# Patient Record
Sex: FEMALE | Race: OTHER | HISPANIC OR LATINO | Employment: UNEMPLOYED | URBAN - NONMETROPOLITAN AREA
[De-identification: names, ages, dates, MRNs, and addresses within clinical notes are randomized per-mention and may not be internally consistent; named-entity substitution may affect disease eponyms.]

---

## 2023-04-20 ENCOUNTER — HOSPITAL ENCOUNTER (OUTPATIENT)
Dept: DATA CONVERSION | Facility: HOSPITAL | Age: 23
End: 2023-04-20
Attending: OBSTETRICS & GYNECOLOGY

## 2023-10-17 ENCOUNTER — APPOINTMENT (OUTPATIENT)
Dept: CARDIOLOGY | Facility: HOSPITAL | Age: 23
End: 2023-10-17

## 2023-10-17 ENCOUNTER — HOSPITAL ENCOUNTER (EMERGENCY)
Facility: HOSPITAL | Age: 23
Discharge: OTHER INSTITUTION | End: 2023-10-17
Attending: EMERGENCY MEDICINE

## 2023-10-17 ENCOUNTER — HOSPITAL ENCOUNTER (EMERGENCY)
Facility: HOSPITAL | Age: 23
Discharge: HOME | End: 2023-10-17
Attending: EMERGENCY MEDICINE

## 2023-10-17 VITALS
TEMPERATURE: 98.1 F | WEIGHT: 245 LBS | HEART RATE: 88 BPM | SYSTOLIC BLOOD PRESSURE: 131 MMHG | HEIGHT: 62 IN | RESPIRATION RATE: 18 BRPM | BODY MASS INDEX: 45.08 KG/M2 | DIASTOLIC BLOOD PRESSURE: 87 MMHG | OXYGEN SATURATION: 99 %

## 2023-10-17 DIAGNOSIS — Z34.93 THIRD TRIMESTER PREGNANCY (HHS-HCC): ICD-10-CM

## 2023-10-17 DIAGNOSIS — R07.9 CHEST PAIN, UNSPECIFIED TYPE: Primary | ICD-10-CM

## 2023-10-17 DIAGNOSIS — R10.9 ABDOMINAL PAIN, UNSPECIFIED ABDOMINAL LOCATION: ICD-10-CM

## 2023-10-17 DIAGNOSIS — R06.00 DYSPNEA, UNSPECIFIED TYPE: ICD-10-CM

## 2023-10-17 PROCEDURE — 99284 EMERGENCY DEPT VISIT MOD MDM: CPT | Performed by: EMERGENCY MEDICINE

## 2023-10-17 PROCEDURE — 93005 ELECTROCARDIOGRAM TRACING: CPT

## 2023-10-17 PROCEDURE — 99285 EMERGENCY DEPT VISIT HI MDM: CPT | Mod: 25

## 2023-10-17 ASSESSMENT — PAIN SCALES - GENERAL
PAINLEVEL_OUTOF10: 0 - NO PAIN
PAINLEVEL_OUTOF10: 9

## 2023-10-17 ASSESSMENT — PAIN DESCRIPTION - LOCATION: LOCATION: CHEST

## 2023-10-17 ASSESSMENT — COLUMBIA-SUICIDE SEVERITY RATING SCALE - C-SSRS
2. HAVE YOU ACTUALLY HAD ANY THOUGHTS OF KILLING YOURSELF?: NO
6. HAVE YOU EVER DONE ANYTHING, STARTED TO DO ANYTHING, OR PREPARED TO DO ANYTHING TO END YOUR LIFE?: NO
1. IN THE PAST MONTH, HAVE YOU WISHED YOU WERE DEAD OR WISHED YOU COULD GO TO SLEEP AND NOT WAKE UP?: NO

## 2023-10-17 ASSESSMENT — PAIN - FUNCTIONAL ASSESSMENT: PAIN_FUNCTIONAL_ASSESSMENT: 0-10

## 2023-10-17 ASSESSMENT — PAIN DESCRIPTION - PAIN TYPE: TYPE: ACUTE PAIN

## 2023-10-17 NOTE — ED PROVIDER NOTES
HPI   Chief Complaint   Patient presents with    Chest Pain     Pt c/o intermittent chest pain, sob and contractions over the past 2 weeks. Feeling shaky today. Pt primarily speaks [place]ish,  present. 32 weeks pregnant       Limitations to History: Language barrier.  Primary language is Turkmen.   at the bedside.    HPI: 23-year-old G1, P0 at 8 months presents with concern for chest pain, shortness of breath, abdominal contractions.  States has been present over the past 2 weeks.  States is worsened over the past 2 days.  No loss of vaginal fluid.  Denies any lower extremity swelling or pain.    Additional History Obtained from: .    ------------------------------------------------------------------------------------------------------------------------------------------  Physical Exam:    VS: As documented in the triage note and EMR flowsheet from this visit were reviewed.    Appearance: Alert. cooperative,  in no acute distress.   Skin: Intact,  dry skin, no lesions, rash, petechiae or purpura.   Eyes: PERRLA, EOMs intact,  Conjunctiva pink with no redness or exudates.   HENT: Normocephalic, atraumatic. Nares patent. No intraoral lesions.   Neck: Supple, without meningismus. Trachea at midline. No lymphadenopathy.  Pulmonary: Clear bilaterally with good chest wall excursion. No rales, rhonchi or wheezing. No accessory muscle use or stridor.  Cardiac: Regular rate and rhythm, no rubs, murmurs, or gallops. No JVD, Carotids without bruits.  Abdomen: Abdomen is soft, nontender.  Gravid.  No palpable organomegaly.  No rebound or guarding.  No CVA tenderness. Nonsurgical abdomen  Genitourinary: Exam deferred.  Musculoskeletal: Full range of motion.  Pulses full and equal. No cyanosis, clubbing, or edema.  Neurological:  Cranial nerves are grossly intact, grossly normal sensation, no weakness, no focal findings identified.  Psychiatric: Appropriate mood and affect.                             No data recorded                Patient History   Past Medical History:   Diagnosis Date    Asthma      History reviewed. No pertinent surgical history.  No family history on file.  Social History     Tobacco Use    Smoking status: Never    Smokeless tobacco: Never   Substance Use Topics    Alcohol use: Never    Drug use: Never       Physical Exam   ED Triage Vitals   Temp Pulse Resp BP   -- -- -- --      SpO2 Temp src Heart Rate Source Patient Position   -- -- -- --      BP Location FiO2 (%)     -- --       Physical Exam    ED Course & MDM   Diagnoses as of 10/17/23 7355   Chest pain, unspecified type   Dyspnea, unspecified type   Abdominal pain, unspecified abdominal location   Third trimester pregnancy       Medical Decision Making  Medical Decision Making:    Patient appears well and nontoxic.  EKG shows no signs of acute ischemia.  Case discussed with OB/GYN on-call Dr. Hurd who advised transfer to [facility] for labor and delivery evaluation.  Case discussed with transfer line and OB/GYN on-call Dr. Fair who accepted the patient.  Stable at time of transfer.    Differential Diagnoses Considered: ACS, pneumothorax, pulmonary embolism, costochondritis, anxiety    Chronic Medical Conditions Significantly Affecting Care: Third trimester pregnancy    Escalation of Care:  Appropriate for transfer to [facility] for OB/GYN evaluation.    Discussion of Management with Other Providers:   I discussed the patient/results with: OB/GYN on-call.            Procedure  ECG 12 lead    Performed by: Alvino Voss DO  Authorized by: Alvino Voss DO    ECG reviewed by ED Physician in the absence of a cardiologist: yes    Comments:      EKG interpreted by Dr. Alvino Voss: Normal sinus rhythm at 88 bpm.  KS interval of 128 ms.  QTc of 438 ms.  No evidence of ST elevation or depression at this time.       Alvino Voss DO  10/17/23 7624